# Patient Record
Sex: FEMALE | URBAN - METROPOLITAN AREA
[De-identification: names, ages, dates, MRNs, and addresses within clinical notes are randomized per-mention and may not be internally consistent; named-entity substitution may affect disease eponyms.]

---

## 2022-01-11 ENCOUNTER — APPOINTMENT (RX ONLY)
Dept: URBAN - METROPOLITAN AREA CLINIC 94 | Facility: CLINIC | Age: 42
Setting detail: DERMATOLOGY
End: 2022-01-11

## 2022-01-11 DIAGNOSIS — Z41.9 ENCOUNTER FOR PROCEDURE FOR PURPOSES OTHER THAN REMEDYING HEALTH STATE, UNSPECIFIED: ICD-10-CM

## 2022-01-11 PROCEDURE — ? WIQO PEEL

## 2022-01-11 PROCEDURE — ? IPL COSMETIC

## 2022-01-11 ASSESSMENT — LOCATION DETAILED DESCRIPTION DERM
LOCATION DETAILED: INFERIOR MID FOREHEAD
LOCATION DETAILED: RIGHT LATERAL MALAR CHEEK
LOCATION DETAILED: RIGHT CHIN
LOCATION DETAILED: LEFT MEDIAL MALAR CHEEK

## 2022-01-11 ASSESSMENT — LOCATION SIMPLE DESCRIPTION DERM
LOCATION SIMPLE: RIGHT CHEEK
LOCATION SIMPLE: CHIN
LOCATION SIMPLE: LEFT CHEEK
LOCATION SIMPLE: INFERIOR FOREHEAD

## 2022-01-11 ASSESSMENT — LOCATION ZONE DERM: LOCATION ZONE: FACE

## 2022-01-11 NOTE — PROCEDURE: WIQO PEEL
Post Peel Care: The WiQo moisturizing face cream for dry skin was applied after the PRX-T33 application.
Chemical Peel: WiQo PRX-T33
Post-Care Instructions: I reviewed with the patient in detail post-care instructions. Patient should apply WiQo facial smoothing fluid for all skin types starting the day after the PRX-T33 application.
Peel: The WiQo Peel PRX-T33 was applied.
Treatment Number: 1
Consent: Prior to the procedure, written consent was obtained and risks were reviewed including but not limited to: redness, peeling, blistering, pigmentary change, scarring, infection, and pain.
Comments: Face, neck, chest treated. First did IPL full face. Post WiQo moisturizer and tinted SPF.
Treatment Time (Optional): 30 min
Prep: The treated site was cleansed with WiQo P solution.
Price (Use Numbers Only, No Special Characters Or $): 500
Detail Level: Zone

## 2022-01-11 NOTE — PROCEDURE: IPL COSMETIC
Pulse Duration (Include Units): 20 ms
Price (Use Numbers Only, No Special Characters Or $): 300
Spotsize (Include Units): 40 ms
End-Point And Post-Procedure Care: The procedure continued until mild purpura was noted. Post care reviewed with patient. Applied SPF post tx. Iced in office and took home ice packs. Patient was also given 2 ibuprofen per her request for swelling.
Fluence (Include Units): 36j/cm^2
Add Additional Location: No
Pulse Duration (Include Units): 40ms
Fluence (Include Units): 34j/cm^2
Treatment Number: 1
Consent: Prior to the procedure consent obtained, risks reviewed including but not limited to crusting, scabbing, blistering, scarring, darker or lighter pigmentary change, incidental hair removal, bruising, and/or incomplete removal.
Fluence (Include Units): 28j/cm^2
Cooling: 5c
Number Of Passes: 1-2
Detail Level: Zone
Post-Care Instructions: I reviewed with the patient in detail post-care instructions. Patient should stay away from the sun and wear sun protection until treated areas are fully healed.
Eye Protection: Laser Aid
Ipl Type: MaxG HP
Pre-Procedure Care: Prior to the procedure the patient and all present had protective eyewear in place and a warning sign was placed on the door.
Indication: IPL full face

## 2022-02-01 ENCOUNTER — APPOINTMENT (RX ONLY)
Dept: URBAN - METROPOLITAN AREA CLINIC 94 | Facility: CLINIC | Age: 42
Setting detail: DERMATOLOGY
End: 2022-02-01

## 2022-02-01 DIAGNOSIS — Z41.9 ENCOUNTER FOR PROCEDURE FOR PURPOSES OTHER THAN REMEDYING HEALTH STATE, UNSPECIFIED: ICD-10-CM

## 2022-02-01 PROCEDURE — ? WIQO PEEL

## 2022-02-01 ASSESSMENT — LOCATION SIMPLE DESCRIPTION DERM
LOCATION SIMPLE: RIGHT CHEEK
LOCATION SIMPLE: NECK
LOCATION SIMPLE: NOSE
LOCATION SIMPLE: RIGHT FOREHEAD
LOCATION SIMPLE: CHEST
LOCATION SIMPLE: LEFT CHEEK
LOCATION SIMPLE: SUBMENTAL CHIN
LOCATION SIMPLE: CHIN

## 2022-02-01 ASSESSMENT — LOCATION ZONE DERM
LOCATION ZONE: FACE
LOCATION ZONE: TRUNK
LOCATION ZONE: NECK
LOCATION ZONE: NOSE

## 2022-02-01 ASSESSMENT — LOCATION DETAILED DESCRIPTION DERM
LOCATION DETAILED: SUBMENTAL CHIN
LOCATION DETAILED: MIDDLE STERNUM
LOCATION DETAILED: RIGHT CENTRAL LATERAL NECK
LOCATION DETAILED: LEFT CENTRAL LATERAL NECK
LOCATION DETAILED: LEFT INFERIOR CENTRAL MALAR CHEEK
LOCATION DETAILED: NASAL DORSUM
LOCATION DETAILED: RIGHT CENTRAL MALAR CHEEK
LOCATION DETAILED: RIGHT MEDIAL FOREHEAD
LOCATION DETAILED: RIGHT CHIN

## 2022-02-01 NOTE — PROCEDURE: WIQO PEEL
Prep: The treated site was cleansed with WiQo P solution.
Treatment Time (Optional): 30 min
Comments: Patient was given full sizes of WIQO products
Consent: Prior to the procedure, written consent was obtained and risks were reviewed including but not limited to: redness, peeling, blistering, pigmentary change, scarring, infection, and pain.
Post Peel Care: The WiQo moisturizing face cream for dry skin was applied after the PRX-T33 application.
Peel: The WiQo Peel PRX-T33 was applied.
Price (Use Numbers Only, No Special Characters Or $): 450
Post-Care Instructions: I reviewed with the patient in detail post-care instructions. Patient should apply WiQo facial smoothing fluid for all skin types starting the day after the PRX-T33 application.
Chemical Peel: WiQo PRX-T33
Detail Level: Zone
Treatment Number: 2

## 2022-02-08 ENCOUNTER — APPOINTMENT (RX ONLY)
Dept: URBAN - METROPOLITAN AREA CLINIC 94 | Facility: CLINIC | Age: 42
Setting detail: DERMATOLOGY
End: 2022-02-08

## 2022-02-08 DIAGNOSIS — Z41.9 ENCOUNTER FOR PROCEDURE FOR PURPOSES OTHER THAN REMEDYING HEALTH STATE, UNSPECIFIED: ICD-10-CM

## 2022-02-08 PROCEDURE — ? WIQO PEEL

## 2022-02-08 ASSESSMENT — LOCATION ZONE DERM
LOCATION ZONE: NECK
LOCATION ZONE: FACE

## 2022-02-08 ASSESSMENT — LOCATION SIMPLE DESCRIPTION DERM
LOCATION SIMPLE: CHIN
LOCATION SIMPLE: LEFT CHEEK
LOCATION SIMPLE: NECK
LOCATION SIMPLE: INFERIOR FOREHEAD
LOCATION SIMPLE: RIGHT CHEEK
LOCATION SIMPLE: SUBMENTAL CHIN

## 2022-02-08 ASSESSMENT — LOCATION DETAILED DESCRIPTION DERM
LOCATION DETAILED: LEFT CENTRAL MALAR CHEEK
LOCATION DETAILED: SUBMENTAL CHIN
LOCATION DETAILED: RIGHT CENTRAL LATERAL NECK
LOCATION DETAILED: RIGHT CENTRAL MALAR CHEEK
LOCATION DETAILED: INFERIOR MID FOREHEAD
LOCATION DETAILED: LEFT MENTAL CREASE
LOCATION DETAILED: LEFT CENTRAL LATERAL NECK

## 2022-02-08 NOTE — PROCEDURE: WIQO PEEL
Treatment Time (Optional): 30 min
Prep: The treated site was cleansed with WiQo P solution.
Peel: The WiQo Peel PRX-T33 was applied. 3 passes full face, two passes neck.
Post-Care Instructions: I reviewed with the patient in detail post-care instructions. Patient should apply WiQo facial smoothing fluid for all skin types starting the day after the PRX-T33 application.
Post Peel Care: The WiQo moisturizing face cream for dry skin was applied after the PRX-T33 application.
Comments: Face, neck, chest. Post SPF.
Chemical Peel: WiQo PRX-T33
Detail Level: Zone
Consent: Prior to the procedure, written consent was obtained and risks were reviewed including but not limited to: redness, peeling, blistering, pigmentary change, scarring, infection, and pain.
Treatment Number: 0
Price (Use Numbers Only, No Special Characters Or $): 450